# Patient Record
Sex: FEMALE | Race: WHITE | URBAN - METROPOLITAN AREA
[De-identification: names, ages, dates, MRNs, and addresses within clinical notes are randomized per-mention and may not be internally consistent; named-entity substitution may affect disease eponyms.]

---

## 2017-03-13 ENCOUNTER — OFFICE VISIT (OUTPATIENT)
Dept: CARDIOLOGY CLINIC | Age: 57
End: 2017-03-13

## 2017-03-13 VITALS
DIASTOLIC BLOOD PRESSURE: 72 MMHG | HEIGHT: 66 IN | HEART RATE: 56 BPM | SYSTOLIC BLOOD PRESSURE: 118 MMHG | WEIGHT: 224.2 LBS | BODY MASS INDEX: 36.03 KG/M2

## 2017-03-13 DIAGNOSIS — R07.89 CHEST DISCOMFORT: Primary | ICD-10-CM

## 2017-03-13 DIAGNOSIS — R00.2 PALPITATIONS: ICD-10-CM

## 2017-03-13 NOTE — PROGRESS NOTES
LAST OFFICE VISIT : 9/9/2016        ICD-10-CM ICD-9-CM   1. Chest discomfort R07.89 786.59   2. Palpitations R00.2 785.1            Iram Chilel is a 62 y.o. female with hypertension referred for 6 month follow up. Cardiac risk factors: post-menopausal, hypertension, sedentary lifestyle. I have personally obtained the history from the patient. HISTORY OF PRESENTING ILLNESS      She is doing well with no cardiac complaints today. She exercises occasionally. The patient denies chest pain/ shortness of breath, orthopnea, PND, LE edema, palpitations, syncope, presyncope or fatigue. ACTIVE PROBLEM LIST     Patient Active Problem List    Diagnosis Date Noted    Murmur, cardiac 07/22/2016    Dyslipidemia 07/22/2016    HTN (hypertension) 07/22/2016    Chest discomfort 07/22/2016    Palpitations 07/22/2016           PAST MEDICAL HISTORY     Past Medical History:   Diagnosis Date    Abnormal LFTs     Dyslipidemia     HTN (hypertension)     Murmur, cardiac            PAST SURGICAL HISTORY     History reviewed. No pertinent surgical history. ALLERGIES     Allergies   Allergen Reactions    Amoxicillin Rash          FAMILY HISTORY     History reviewed. No pertinent family history. negative for cardiac disease       SOCIAL HISTORY     Social History     Social History    Marital status:      Spouse name: N/A    Number of children: N/A    Years of education: N/A     Social History Main Topics    Smoking status: Never Smoker    Smokeless tobacco: None    Alcohol use No    Drug use: None    Sexual activity: Not Asked     Other Topics Concern    None     Social History Narrative         MEDICATIONS     Current Outpatient Prescriptions   Medication Sig    potassium chloride (K-DUR, KLOR-CON) 10 mEq tablet Take 20 mEq by mouth.  atorvastatin (LIPITOR) 10 mg tablet Take  by mouth daily.  aspirin delayed-release 81 mg tablet Take  by mouth daily.     valsartan-hydrochlorothiazide (DIOVAN-HCT) 160-25 mg per tablet Take 1 Tab by mouth daily. No current facility-administered medications for this visit. I have reviewed the nurses notes, vitals, problem list, allergy list, medical history, family, social history and medications. REVIEW OF SYMPTOMS      General: Pt denies excessive weight gain or loss. Pt is able to conduct ADL's  HEENT: Denies blurred vision, headaches, hearing loss, epistaxis and difficulty swallowing. Respiratory: Denies cough, congestion, shortness of breath, SHERIDAN, wheezing or stridor. Cardiovascular: Denies precordial pain, palpitations, edema or PND  Gastrointestinal: Denies poor appetite, indigestion, abdominal pain or blood in stool  Genitourinary: Denies hematuria, dysuria, increased urinary frequency  Musculoskeletal: Denies joint pain or swelling from muscles or joints  Neurologic: Denies tremor, paresthesias, headache, or sensory motor disturbance  Psychiatric: Denies confusion, insomnia, depression  Integumentray: Denies rash, itching or ulcers. Hematologic: Denies easy bruising, bleeding     PHYSICAL EXAMINATION      Vitals:    03/13/17 0933   BP: 118/72   Pulse: (!) 56   Weight: 224 lb 3.2 oz (101.7 kg)   Height: 5' 6\" (1.676 m)     General: Well developed, in no acute distress. HEENT: No jaundice, oral mucosa moist, no oral ulcers  Neck: Supple, no stiffness, no lymphadenopathy, supple  Heart:  Normal S1/S2 negative S3 or S4. Regular, no murmur, gallop or rub, no jugular venous distention  Respiratory: Clear bilaterally x 4, no wheezing or rales  Extremities:  No edema, normal cap refill, no cyanosis. Musculoskeletal: No clubbing, no deformities  Neuro: A&Ox3, speech clear, gait stable, cooperative, no focal neurologic deficits  Skin: Skin color is normal. No rashes or lesions. Non diaphoretic, moist.  Vascular: 2+ pulses symmetric in all extremities        EKG:      DIAGNOSTIC DATA     1.  Lipids  6/8/16- , HDL 40, LDL 76,     2. Echo  8/8/16- EF 60%, AV trileaflets exhibits sclerosis    3. Cardiolite  8/8/16- no ischemia    4. Holter  9/16/16- SR , frequent PVC's     LABORATORY DATA          No results found for: WBC, HGBPOC, HGB, HGBP, HCTPOC, HCT, PHCT, RBCH, PLT, MCV, HGBEXT, HCTEXT, PLTEXT, HGBEXT, HCTEXT, PLTEXT   No results found for: NA, K, CL, CO2, AGAP, GLU, BUN, CREA, BUCR, GFRAA, GFRNA, CA, TBIL, TBILI, GPT, SGOT, AP, TP, ALB, GLOB, AGRAT, ALT        ASSESSMENT/RECOMMENDATIONS:.      1. Chest discomfort  -no complaints of chest pain       2. Hypertension  -BP is under good control       3. Lipids   -cholesterol followed by her PCP       4. Palpitations  -she states that her palpitations have resolved  -24 hour holter monitor demonstrated frequent PVC's but nothing of great significance  -no testing needed   -the total ventricular ectopic beats represented only 1.28% of all beats      5. Return in 6 months or PRN     No orders of the defined types were placed in this encounter. Follow-up Disposition:  Return in about 6 months (around 9/13/2017). I have discussed the diagnosis with  Robert Abdullahi and the intended plan as seen in the above orders. Questions were answered concerning future plans. I have discussed medication side effects and warnings with the patient as well. Thank you,  Angy Roman MD for involving me in the care of  Robert Abdullahi. Please do not hesitate to contact me for further questions/concerns. This note was written by tono Wang, as dictated by Lalit Zavala MD.      Susana Gonsalves. MD [place], [address]      [facility], [address] [place] [address], [facility] [address] Jeane Desai.      [phone] / [phone] Fax

## 2017-03-13 NOTE — PROGRESS NOTES
Visit Vitals    /72 (BP 1 Location: Right arm, BP Patient Position: Sitting)    Pulse (!) 56    Ht 5' 6\" (1.676 m)    Wt 224 lb 3.2 oz (101.7 kg)    BMI 36.19 kg/m2

## 2017-03-13 NOTE — MR AVS SNAPSHOT
Visit Information Date & Time Provider Department Dept. Phone Encounter #  
 3/13/2017  9:20 AM Paige Bonilla MD [facility] [phone] [id number] Follow-up Instructions Return in about 6 months (around 9/13/2017). Upcoming Health Maintenance Date Due Hepatitis C Screening 1960 DTaP/Tdap/Td series (1 - Tdap) 2/24/1981 PAP AKA CERVICAL CYTOLOGY 2/24/1981 BREAST CANCER SCRN MAMMOGRAM 2/24/2010 FOBT Q 1 YEAR AGE 50-75 2/24/2010 INFLUENZA AGE 9 TO ADULT 8/1/2016 Allergies as of 3/13/2017  Review Complete On: 3/13/2017 By: Paige Bonilla MD  
  
 Severity Noted Reaction Type Reactions Amoxicillin  07/22/2016    Rash Current Immunizations  Never Reviewed No immunizations on file. Not reviewed this visit You Were Diagnosed With   
  
 Codes Comments Chest discomfort    -  Primary ICD-10-CM: R07.89 ICD-9-CM: 786.59 Palpitations     ICD-10-CM: R00.2 ICD-9-CM: 785.1 Vitals BP Pulse Height(growth percentile) Weight(growth percentile) BMI Smoking Status 118/72 (BP 1 Location: Right arm, BP Patient Position: Sitting) (!) 56 5' 6\" (1.676 m) 224 lb 3.2 oz (101.7 kg) 36.19 kg/m2 Never Smoker Vitals History BMI and BSA Data Body Mass Index Body Surface Area  
 36.19 kg/m 2 2.18 m 2 Preferred Pharmacy Pharmacy Name Phone [facility] [address] [place] [address] Drive, [address] [address] [phone] Your Updated Medication List  
  
   
This list is accurate as of: 3/13/17 10:03 AM.  Always use your most recent med list.  
  
  
  
  
 aspirin delayed-release 81 mg tablet Take  by mouth daily. atorvastatin 10 mg tablet Commonly known as:  LIPITOR Take  by mouth daily. potassium chloride 10 mEq tablet Commonly known as:  K-DUR, KLOR-CON Take 20 mEq by mouth.  
  
 valsartan-hydroCHLOROthiazide 160-25 mg per tablet Commonly known as:  DIOVAN-HCT Take 1 Tab by mouth daily. Follow-up Instructions Return in about 6 months (around 9/13/2017). Introducing [facility] SERVICES! Denzel Arenas introduces [employer] patient portal. Now you can access parts of your medical record, email your doctor's office, and request medication refills online. 1. In your internet browser, go to [email or web address]. [employer] 2. Click on the First Time User? Click Here link in the Sign In box. You will see the New Member Sign Up page. 3. Enter your [employer] Access Code exactly as it appears below. You will not need to use this code after youve completed the sign-up process. If you do not sign up before the expiration date, you must request a new code. · [employer] Access Code: [email or web address] Expires: 6/11/2017  9:33 AM 
 
4. Enter the last four digits of your Social Security Number (xxxx) and Date of Birth (mm/dd/yyyy) as indicated and click Submit. You will be taken to the next sign-up page. 5. Create a [employer] ID. This will be your [employer] login ID and cannot be changed, so think of one that is secure and easy to remember. 6. Create a [employer] password. You can change your password at any time. 7. Enter your Password Reset Question and Answer. This can be used at a later time if you forget your password. 8. Enter your e-mail address. You will receive e-mail notification when new information is available in [address] 9. Click Sign Up. You can now view and download portions of your medical record. 10. Click the Download Summary menu link to download a portable copy of your medical information. If you have questions, please visit the Frequently Asked Questions section of the [employer] website. Remember, [employer] is NOT to be used for urgent needs. For medical emergencies, dial 911. Now available from your iPhone and Android! Please provide this summary of care documentation to your next provider. Your primary care clinician is listed as Cassandra Sykes. If you have any questions after today's visit, please call [phone].

## 2017-11-17 ENCOUNTER — OFFICE VISIT (OUTPATIENT)
Dept: CARDIOLOGY CLINIC | Age: 57
End: 2017-11-17

## 2017-11-17 VITALS
HEART RATE: 71 BPM | SYSTOLIC BLOOD PRESSURE: 130 MMHG | WEIGHT: 214 LBS | DIASTOLIC BLOOD PRESSURE: 82 MMHG | BODY MASS INDEX: 34.54 KG/M2 | OXYGEN SATURATION: 98 %

## 2017-11-17 DIAGNOSIS — I10 HYPERTENSION, UNSPECIFIED TYPE: Primary | ICD-10-CM

## 2017-11-17 NOTE — PROGRESS NOTES
Pt has no complaints/no cardiac concerns    Visit Vitals    /82 (BP 1 Location: Left arm, BP Patient Position: Sitting)    Pulse 71    Wt 214 lb (97.1 kg)    SpO2 98%    BMI 34.54 kg/m2

## 2017-11-17 NOTE — PROGRESS NOTES
LAST OFFICE VISIT : 3/13/2017        ICD-10-CM ICD-9-CM   1. Hypertension, unspecified type I10 401.9            Iram Chilel is a 62 y.o. female with hypertension referred for 6 month follow up.          Cardiac risk factors: post-menopausal, hypertension, sedentary lifestyle. I have personally obtained the history from the patient. HISTORY OF PRESENTING ILLNESS     Overall the pt states she is doing well. The pt reports that she had to get a cortisone shot the day before she had lab work done. She attributes this to her elevated cholesterol. The pt has lost 10 lbs since her last office visit. The patient denies chest pain/ shortness of breath, orthopnea, PND, LE edema, palpitations, syncope, presyncope or fatigue. ACTIVE PROBLEM LIST     Patient Active Problem List    Diagnosis Date Noted    Murmur, cardiac 07/22/2016    Dyslipidemia 07/22/2016    HTN (hypertension) 07/22/2016    Chest discomfort 07/22/2016    Palpitations 07/22/2016           PAST MEDICAL HISTORY     Past Medical History:   Diagnosis Date    Abnormal LFTs     Dyslipidemia     HTN (hypertension)     Murmur, cardiac            PAST SURGICAL HISTORY     No past surgical history on file. ALLERGIES     Allergies   Allergen Reactions    Amoxicillin Rash          FAMILY HISTORY     No family history on file. negative for cardiac disease       SOCIAL HISTORY     Social History     Social History    Marital status:      Spouse name: N/A    Number of children: N/A    Years of education: N/A     Social History Main Topics    Smoking status: Never Smoker    Smokeless tobacco: Never Used    Alcohol use No    Drug use: None    Sexual activity: Not Asked     Other Topics Concern    None     Social History Narrative         MEDICATIONS     Current Outpatient Prescriptions   Medication Sig    potassium chloride (K-DUR, KLOR-CON) 10 mEq tablet Take 10 mEq by mouth.     atorvastatin (LIPITOR) 10 mg tablet Take by mouth daily.  aspirin delayed-release 81 mg tablet Take  by mouth daily.  valsartan-hydrochlorothiazide (DIOVAN-HCT) 160-25 mg per tablet Take 1 Tab by mouth daily. No current facility-administered medications for this visit. I have reviewed the nurses notes, vitals, problem list, allergy list, medical history, family, social history and medications. REVIEW OF SYMPTOMS      General: Pt denies excessive weight gain or loss. Pt is able to conduct ADL's  HEENT: Denies blurred vision, headaches, hearing loss, epistaxis and difficulty swallowing. Respiratory: Denies cough, congestion, shortness of breath, SHERIDAN, wheezing or stridor. Cardiovascular: Denies precordial pain, palpitations, edema or PND  Gastrointestinal: Denies poor appetite, indigestion, abdominal pain or blood in stool  Genitourinary: Denies hematuria, dysuria, increased urinary frequency  Musculoskeletal: Denies joint pain or swelling from muscles or joints  Neurologic: Denies tremor, paresthesias, headache, or sensory motor disturbance  Psychiatric: Denies confusion, insomnia, depression  Integumentray: Denies rash, itching or ulcers. Hematologic: Denies easy bruising, bleeding     PHYSICAL EXAMINATION      Vitals:    11/17/17 1457   BP: 130/82   Pulse: 71   SpO2: 98%   Weight: 214 lb (97.1 kg)     General: Well developed, in no acute distress. HEENT: No jaundice, oral mucosa moist, no oral ulcers  Neck: Supple, no stiffness, no lymphadenopathy, supple  Heart:  Normal S1/S2 negative S3 or S4. Regular, no murmur, gallop or rub, no jugular venous distention  Respiratory: Clear bilaterally x 4, no wheezing or rales  Extremities:  No edema, normal cap refill, no cyanosis. Musculoskeletal: No clubbing, no deformities  Neuro: A&Ox3, speech clear, gait stable, cooperative, no focal neurologic deficits  Skin: Skin color is normal. No rashes or lesions. Non diaphoretic, moist.          EKG: NSR     DIAGNOSTIC DATA     1. Lipids  6/8/16- , HDL 40, LDL 76,   11/1/17- , HDL 49, , TG 81    2. Echo  8/8/16- EF 60%, AV trileaflets exhibits sclerosis    3. Cardiolite  8/8/16- no ischemia    4. Holter  9/16/16- SR , frequent PVC's         LABORATORY DATA          No results found for: WBC, HGBPOC, HGB, HGBP, HCTPOC, HCT, PHCT, RBCH, PLT, MCV, HGBEXT, HCTEXT, PLTEXT, HGBEXT, HCTEXT, PLTEXT   No results found for: NA, K, CL, CO2, AGAP, GLU, BUN, CREA, BUCR, GFRAA, GFRNA, CA, TBIL, TBILI, GPT, SGOT, AP, TP, ALB, GLOB, AGRAT, ALT        ASSESSMENT/RECOMMENDATIONS:.      1. Chest discomfort  - she is asymptomatic at this time. I do not believe any further cardiac testing is needed. Continue risk factor modification. In 2016 all testing was negative. 2. Hypertension  - BP is well controlled in clinic today. I would not make any adjustments to her antihypertensives at this time. I counseled her to continue to exercise and eat a low sodium diet. 3. Lipids  -Cholesterol is follow by Unique Kathleen MD. LDL is close to goal but was mildly elevated due to a steroid injection she presumes. 4. Palpitations  - She has no complaints of palpitations today. I do not believe any further cardiac testing is needed. 5. Return in 6 months or PRN. Orders Placed This Encounter    AMB POC EKG ROUTINE W/ 12 LEADS, INTER & REP     Order Specific Question:   Reason for Exam:     Answer:   htn        Follow-up Disposition: Not on File      I have discussed the diagnosis with  Henrry Leal and the intended plan as seen in the above orders. Questions were answered concerning future plans. I have discussed medication side effects and warnings with the patient as well. Thank you,  Unique Kathleen MD for involving me in the care of  Henrry Leal. Please do not hesitate to contact me for further questions/concerns. Written by Iesha Maguire, as dictated by Jaqueline Woods MD.     Owen Gomez MD, [address]      [facility], [address] [place] [address], [facility] [address]      [phone] / [phone] Fax

## 2018-06-29 ENCOUNTER — OFFICE VISIT (OUTPATIENT)
Dept: CARDIOLOGY CLINIC | Age: 58
End: 2018-06-29

## 2018-06-29 VITALS
WEIGHT: 217 LBS | HEIGHT: 66 IN | BODY MASS INDEX: 34.87 KG/M2 | SYSTOLIC BLOOD PRESSURE: 140 MMHG | HEART RATE: 80 BPM | DIASTOLIC BLOOD PRESSURE: 80 MMHG

## 2018-06-29 DIAGNOSIS — I10 HYPERTENSION, UNSPECIFIED TYPE: Primary | ICD-10-CM

## 2018-06-29 DIAGNOSIS — R00.2 PALPITATIONS: ICD-10-CM

## 2018-06-29 NOTE — PROGRESS NOTES
LAST OFFICE VISIT : 11/17/2017        ICD-10-CM ICD-9-CM   1. Hypertension, unspecified type I10 401.9   2. Palpitations R00.2 785.1            Elvira Kovacs is a 62 y.o. female with hypertension and dyslipidemia referred for 6 month follow up. Cardiac risk factors: dyslipidemia, obesity, sedentary life style, hypertension, post-menopausal  I have personally obtained the history from the patient. HISTORY OF PRESENTING ILLNESS     Overall the pt states she is doing well. She notes that on Saturday around 6 am she woke up with a chest spasm. The pt states that she was diaphoretic at the time. She notes that her last episode of this was in 2016. The pt states that she ate a late night snack and believes that this caused her to have indigestion. She notes that this episode was short. The pt does not feel that she needs a stress test. She states that she had a colonoscopy done and had a breast cancer screening. The pt reports that she is not walking as much as she should be. The patient denies chest pain/ shortness of breath, orthopnea, PND, LE edema, palpitations, syncope, presyncope or fatigue. ACTIVE PROBLEM LIST     Patient Active Problem List    Diagnosis Date Noted    Murmur, cardiac 07/22/2016    Dyslipidemia 07/22/2016    HTN (hypertension) 07/22/2016    Chest discomfort 07/22/2016    Palpitations 07/22/2016           PAST MEDICAL HISTORY     Past Medical History:   Diagnosis Date    Abnormal LFTs     Dyslipidemia     HTN (hypertension)     Murmur, cardiac            PAST SURGICAL HISTORY     No past surgical history on file. ALLERGIES     Allergies   Allergen Reactions    Amoxicillin Rash          FAMILY HISTORY     No family history on file.  negative for cardiac disease       SOCIAL HISTORY     Social History     Social History    Marital status:      Spouse name: N/A    Number of children: N/A    Years of education: N/A     Social History Main Topics    Smoking status: Never Smoker    Smokeless tobacco: Never Used    Alcohol use No    Drug use: None    Sexual activity: Not Asked     Other Topics Concern    None     Social History Narrative         MEDICATIONS     Current Outpatient Prescriptions   Medication Sig    potassium chloride (K-DUR, KLOR-CON) 10 mEq tablet Take 10 mEq by mouth.  atorvastatin (LIPITOR) 10 mg tablet Take  by mouth daily.  aspirin delayed-release 81 mg tablet Take  by mouth daily.  valsartan-hydrochlorothiazide (DIOVAN-HCT) 160-25 mg per tablet Take 1 Tab by mouth daily. No current facility-administered medications for this visit. I have reviewed the nurses notes, vitals, problem list, allergy list, medical history, family, social history and medications. REVIEW OF SYMPTOMS      General: Pt denies excessive weight gain or loss. Pt is able to conduct ADL's  HEENT: Denies blurred vision, headaches, hearing loss, epistaxis and difficulty swallowing. Respiratory: Denies cough, congestion, shortness of breath, SHERIDAN, wheezing or stridor. Cardiovascular: Denies precordial pain, palpitations, edema or PND  Gastrointestinal: Denies poor appetite, indigestion, abdominal pain or blood in stool  Genitourinary: Denies hematuria, dysuria, increased urinary frequency  Musculoskeletal: Denies joint pain or swelling from muscles or joints  Neurologic: Denies tremor, paresthesias, headache, or sensory motor disturbance  Psychiatric: Denies confusion, insomnia, depression  Integumentray: Denies rash, itching or ulcers. Hematologic: Denies easy bruising, bleeding     PHYSICAL EXAMINATION      Vitals:    06/29/18 1347   BP: 140/80   Pulse: 80   Weight: 217 lb (98.4 kg)   Height: 5' 6\" (1.676 m)     General: Well developed, in no acute distress. HEENT: No jaundice, oral mucosa moist, no oral ulcers  Neck: Supple, no stiffness, no lymphadenopathy, supple  Heart:  Normal S1/S2 negative S3 or S4.  Regular, no murmur, gallop or rub, no jugular venous distention  Respiratory: Clear bilaterally x 4, no wheezing or rales  Extremities:  No edema, normal cap refill, no cyanosis. Musculoskeletal: No clubbing, no deformities  Neuro: A&Ox3, speech clear, gait stable, cooperative, no focal neurologic deficits  Skin: Skin color is normal. No rashes or lesions. Non diaphoretic, moist.           DIAGNOSTIC DATA     1. Lipids  6/8/16- , HDL 40, LDL 76,   11/1/17- , HDL 49, , TG 81  4/18/18- , HDL 49, LDL 80,     2. Echo  8/8/16- EF 60%, AV trileaflets exhibits sclerosis    3. Cardiolite  8/8/16- no ischemia    4. Holter  9/16/16- SR , frequent PVC's         LABORATORY DATA          No results found for: WBC, HGBPOC, HGB, HGBP, HCTPOC, HCT, PHCT, RBCH, PLT, MCV, HGBEXT, HCTEXT, PLTEXT, HGBEXT, HCTEXT, PLTEXT   No results found for: NA, K, CL, CO2, AGAP, GLU, BUN, CREA, BUCR, GFRAA, GFRNA, CA, TBIL, TBILI, GPT, SGOT, AP, TP, ALB, GLOB, AGRAT, ALT        ASSESSMENT/RECOMMENDATIONS:.      1. Chest discomfort  - Had an episode but this sounds non cardiac. I don't believe any cardiac testing is needed at this point and she agrees. If any more episodes were to occur would automatically order an exercise cardiolite, she knows to call. 2. Hypertension  - Blood pressure is borderline, I would not make any adjustments in antihypertensives at this time. I encouraged her to reduce her sodium intake and increase her exercise. 3. Dyslipidemia   - Lipids are being followed by John Chun MD and have come down since she finished her steroid injections. 4. Return in 6 months or PRN. No orders of the defined types were placed in this encounter. Follow-up Disposition:  Return in about 6 months (around 12/29/2018). I have discussed the diagnosis with  Luann Palomares and the intended plan as seen in the above orders. Questions were answered concerning future plans.   I have discussed medication side effects and warnings with the patient as well. Thank you,  Amairani Ortega MD for involving me in the care of  Milind Weiner. Please do not hesitate to contact me for further questions/concerns. Written by Hi Leong, as dictated by Santos Hodgson MD.     Sudarshan Orozco MD, [facility]    Patient Care Team:  Amairani Ortega MD as PCP - General (Family Practice)    [address], [address] [place] [address]      [phone] / [phone] Fax

## 2018-06-29 NOTE — MR AVS SNAPSHOT
[address] [address] 
[phone] Patient: Elvira Kovacs MRN: [id number] KMQ:7/31/[birth year]24 Visit Information Date & Time Provider Department Dept. Phone Encounter #  
 6/29/2018  1:40 PM Mary Pink MD [facility] Yaritza Castro [phone] [id number] Follow-up Instructions Return in about 6 months (around 12/29/2018). Follow-up and Disposition History Your Appointments 1/11/2019  1:40 PM  
ESTABLISHED PATIENT with Mary Pink MD  
[facility] ([facility] SCHEDULING) Appt Note: 6 mo fu appt [address] [address] [address]  
[phone]  
  
   
 [address] [facility] [address] [place] [address] Road [address] Upcoming Health Maintenance Date Due Hepatitis C Screening 1960 DTaP/Tdap/Td series (1 - Tdap) 2/24/1981 PAP AKA CERVICAL CYTOLOGY 2/24/1981 BREAST CANCER SCRN MAMMOGRAM 2/24/2010 FOBT Q 1 YEAR AGE 50-75 2/24/2010 Influenza Age 5 to Adult 8/1/2018 Allergies as of 6/29/2018  Review Complete On: 6/29/2018 By: Mary Pink MD  
  
 Severity Noted Reaction Type Reactions Amoxicillin  07/22/2016    Rash Current Immunizations  Never Reviewed No immunizations on file. Not reviewed this visit You Were Diagnosed With   
  
 Codes Comments Hypertension, unspecified type    -  Primary ICD-10-CM: I10 
ICD-9-CM: 401.9 Palpitations     ICD-10-CM: R00.2 ICD-9-CM: 785.1 Vitals BP Pulse Height(growth percentile) Weight(growth percentile) BMI Smoking Status 140/80 80 5' 6\" (1.676 m) 217 lb (98.4 kg) 35.02 kg/m2 Never Smoker Vitals History BMI and BSA Data Body Mass Index Body Surface Area 35.02 kg/m 2 2.14 m 2 Preferred Pharmacy Pharmacy Name Phone [address], [address] [place] [address] [address]. [address] [facility] [address] [phone] Your Updated Medication List  
  
   
 This list is accurate as of 6/29/18  2:00 PM.  Always use your most recent med list.  
  
  
  
  
 aspirin delayed-release 81 mg tablet Take  by mouth daily. atorvastatin 10 mg tablet Commonly known as:  LIPITOR Take  by mouth daily. potassium chloride 10 mEq tablet Commonly known as:  KLOR-CON Take 10 mEq by mouth.  
  
 valsartan-hydroCHLOROthiazide 160-25 mg per tablet Commonly known as:  DIOVAN-HCT Take 1 Tab by mouth daily. Follow-up Instructions Return in about 6 months (around 12/29/2018). Introducing [facility]! Raman Ly introduces [employer] patient portal. Now you can access parts of your medical record, email your doctor's office, and request medication refills online. 1. In your internet browser, go to [email or web address]. [email or web address] 2. Click on the First Time User? Click Here link in the Sign In box. You will see the New Member Sign Up page. 3. Enter your [employer] Access Code exactly as it appears below. You will not need to use this code after youve completed the sign-up process. If you do not sign up before the expiration date, you must request a new code. · [employer] Access Code: [email or web address] Expires: 9/27/2018  1:46 PM 
 
4. Enter the last four digits of your Social Security Number (xxxx) and Date of Birth (mm/dd/yyyy) as indicated and click Submit. You will be taken to the next sign-up page. 5. Create a [employer] ID. This will be your [employer] login ID and cannot be changed, so think of one that is secure and easy to remember. 6. Create a [employer] password. You can change your password at any time. 7. Enter your Password Reset Question and Answer. This can be used at a later time if you forget your password. 8. Enter your e-mail address. You will receive e-mail notification when new information is available in [address] 9. Click Sign Up. You can now view and download portions of your medical record. 10. Click the Download Summary menu link to download a portable copy of your medical information. If you have questions, please visit the Frequently Asked Questions section of the [employer] website. Remember, [employer] is NOT to be used for urgent needs. For medical emergencies, dial 911. Now available from your iPhone and Android! Please provide this summary of care documentation to your next provider. Your primary care clinician is listed as Cassandra Sykes. If you have any questions after today's visit, please call [phone].

## 2018-06-29 NOTE — PROGRESS NOTES
Visit Vitals    /80    Pulse 80    Ht 5' 6\" (1.676 m)    Wt 217 lb (98.4 kg)    BMI 35.02 kg/m2

## 2019-04-12 ENCOUNTER — OFFICE VISIT (OUTPATIENT)
Dept: CARDIOLOGY CLINIC | Age: 59
End: 2019-04-12

## 2019-04-12 VITALS
HEART RATE: 69 BPM | RESPIRATION RATE: 16 BRPM | OXYGEN SATURATION: 95 % | SYSTOLIC BLOOD PRESSURE: 140 MMHG | WEIGHT: 217 LBS | HEIGHT: 66 IN | DIASTOLIC BLOOD PRESSURE: 86 MMHG | BODY MASS INDEX: 34.87 KG/M2

## 2019-04-12 DIAGNOSIS — I10 HYPERTENSION, UNSPECIFIED TYPE: ICD-10-CM

## 2019-04-12 DIAGNOSIS — E78.5 DYSLIPIDEMIA: ICD-10-CM

## 2019-04-12 DIAGNOSIS — R00.2 PALPITATIONS: ICD-10-CM

## 2019-04-12 DIAGNOSIS — I49.9 IRREGULAR HEART BEAT: Primary | ICD-10-CM

## 2019-04-12 PROBLEM — E66.01 SEVERE OBESITY (HCC): Status: ACTIVE | Noted: 2019-04-12

## 2019-04-12 RX ORDER — LISINOPRIL AND HYDROCHLOROTHIAZIDE 12.5; 2 MG/1; MG/1
TABLET ORAL
Refills: 0 | COMMUNITY
Start: 2019-04-07

## 2019-04-12 NOTE — PROGRESS NOTES
LAST OFFICE VISIT : 6/29/2018        ICD-10-CM ICD-9-CM   1. Irregular heart beat I49.9 427.9   2. Hypertension, unspecified type I10 401.9   3. Dyslipidemia E78.5 272.4   4. Palpitations R00.2 785.1            Marcos Hernandez is a 61 y.o. female with HTN and dyslipidemia referred for follow up. Cardiac risk factors: dyslipidemia, obesity, hypertension, post-menopausal, stress  I have personally obtained the history from the patient. HISTORY OF PRESENTING ILLNESS     Pt has not been doing well as her brother was diagnosed with bladder CA and recently passed away. Pt will be getting lab work soon with PCP. The patient denies chest pain/ shortness of breath, orthopnea, PND, LE edema, palpitations, syncope, presyncope or fatigue. ACTIVE PROBLEM LIST     Patient Active Problem List    Diagnosis Date Noted    Severe obesity ([address] 04/12/2019    Murmur, cardiac 07/22/2016    Dyslipidemia 07/22/2016    HTN (hypertension) 07/22/2016    Chest discomfort 07/22/2016    Palpitations 07/22/2016           PAST MEDICAL HISTORY     Past Medical History:   Diagnosis Date    Abnormal LFTs     Dyslipidemia     HTN (hypertension)     Murmur, cardiac            PAST SURGICAL HISTORY     History reviewed. No pertinent surgical history.        ALLERGIES     Allergies   Allergen Reactions    Amoxicillin Rash          FAMILY HISTORY     Family History   Problem Relation Age of Onset    Diabetes Mother     Hypertension Mother     Hypertension Father     negative for cardiac disease       SOCIAL HISTORY     Social History     Socioeconomic History    Marital status:      Spouse name: Not on file    Number of children: Not on file    Years of education: Not on file    Highest education level: Not on file   Tobacco Use    Smoking status: Never Smoker    Smokeless tobacco: Never Used   Substance and Sexual Activity    Alcohol use: No     Alcohol/week: 0.0 oz         MEDICATIONS     Current Outpatient Medications   Medication Sig    lisinopril-hydroCHLOROthiazide (PRINZIDE, ZESTORETIC) 20-12.5 mg per tablet     potassium chloride (K-DUR, KLOR-CON) 10 mEq tablet Take 10 mEq by mouth.  atorvastatin (LIPITOR) 10 mg tablet Take  by mouth daily.  aspirin delayed-release 81 mg tablet Take  by mouth daily. No current facility-administered medications for this visit. I have reviewed the nurses notes, vitals, problem list, allergy list, medical history, family, social history and medications. REVIEW OF SYMPTOMS      General: Pt denies excessive weight gain or loss. Pt is able to conduct ADL's  HEENT: Denies blurred vision, headaches, hearing loss, epistaxis and difficulty swallowing. Respiratory: Denies cough, congestion, shortness of breath, SHERIDAN, wheezing or stridor. Cardiovascular: Denies precordial pain, palpitations, edema or PND  Gastrointestinal: Denies poor appetite, indigestion, abdominal pain or blood in stool  Genitourinary: Denies hematuria, dysuria, increased urinary frequency  Musculoskeletal: Denies joint pain or swelling from muscles or joints  Neurologic: Denies tremor, paresthesias, headache, or sensory motor disturbance  Psychiatric: Denies confusion, insomnia, depression  Integumentray: Denies rash, itching or ulcers. Hematologic: Denies easy bruising, bleeding     PHYSICAL EXAMINATION      Vitals:    04/12/19 1352   BP: 140/86   Pulse: 69   Resp: 16   SpO2: 95%   Weight: 217 lb (98.4 kg)   Height: 5' 6\" (1.676 m)     General: Well developed, in no acute distress. HEENT: No jaundice, oral mucosa moist, no oral ulcers  Neck: Supple, no stiffness, no lymphadenopathy, supple  Heart:  Normal S1/S2 negative S3 or S4. Regular, no murmur, gallop or rub, no jugular venous distention  Respiratory: Clear bilaterally x 4, no wheezing or rales  Abdomen:   Soft, non-tender, bowel sounds are active.   Extremities:  No edema, normal cap refill, no cyanosis.   Musculoskeletal: No clubbing, no deformities  Neuro: A&Ox3, speech clear, gait stable, cooperative, no focal neurologic deficits  Skin: Skin color is normal. No rashes or lesions. Non diaphoretic, moist.  Vascular: 2+ pulses symmetric in all extremities        EKG: NSR     DIAGNOSTIC DATA     1. Lipids  6/8/16- , HDL 40, LDL 76,   11/1/17- , HDL 49, , TG 81  4/18/18- , HDL 49, LDL 80,     2. Echo  8/8/16- EF 60%, AV trileaflets exhibits sclerosis    3. Cardiolite  8/8/16- no ischemia    4. Holter  9/16/16- SR , frequent PVC's         LABORATORY DATA          No results found for: WBC, HGBPOC, HGB, HGBP, HCTPOC, HCT, PHCT, RBCH, PLT, MCV, HGBEXT, HCTEXT, PLTEXT, HGBEXT, HCTEXT, PLTEXT   No results found for: NA, K, CL, CO2, AGAP, GLU, BUN, CREA, BUCR, GFRAA, GFRNA, CA, TBIL, TBILI, GPT, SGOT, AP, TP, ALB, GLOB, AGRAT, ALT        ASSESSMENT/RECOMMENDATIONS:.      1. Chest discomfort   - she is asymptomatic at this time. I do not believe any further cardiac testing is needed. Continue risk factor modification. She'll let me know if she has any reoccurring discomfort and will consider ordering testings then. 2. HTN   - BP is borderline elevated. Counseled on low-sodium diet. Grade I HTN.   - Will not adjust her antihypertensives at this time as she's been going through a lot of stress at this time. - Will have her come back in 2 weeks for BP recheck. 3. Dyslipidemia  - Lipids are at goal on current medical regimen. Followed by Kevin Guerrero MD   4. Return in 6 months or PRN. Orders Placed This Encounter    AMB POC EKG ROUTINE W/ 12 LEADS, INTER & REP     Order Specific Question:   Reason for Exam:     Answer:   Routine    lisinopril-hydroCHLOROthiazide (PRINZIDE, ZESTORETIC) 20-12.5 mg per tablet     Refill:  0          Follow-up and Dispositions  ·   Return in about 6 months (around 10/12/2019).            I have discussed the diagnosis with  Oma De La Paz and the intended plan as seen in the above orders. Questions were answered concerning future plans. I have discussed medication side effects and warnings with the patient as well. Thank you,  Chani Molina MD for involving me in the care of  Henrry Leal. Please do not hesitate to contact me for further questions/concerns. Written by Jason Dunn, as dictated by Jaqueline Woods MD.     Owen Gomez MD, [facility]    Patient Care Team:  Chani Molina MD as PCP - General (Family Practice)  Kristian Mayberry MD (Cardiology)    [address], [address] [place] [address] Drive      [phone] / [phone] Fax

## 2019-04-12 NOTE — PROGRESS NOTES
1. Have you been to the ER, urgent care clinic since your last visit? Hospitalized since your last visit? No    2. Have you seen or consulted any other health care providers outside of the [address] [place] [address] since your last visit? Include any pap smears or colon screening. No     Pt reports Med Rec. Completed.      Chief Complaint   Patient presents with    Hypertension    Irregular Heart Beat     Visit Vitals  /86 (BP 1 Location: Right arm, BP Patient Position: Sitting)   Pulse 69   Resp 16   Ht 5' 6\" (1.676 m)   Wt 217 lb (98.4 kg)   SpO2 95%   BMI 35.02 kg/m²

## 2019-04-12 NOTE — LETTER
4/12/19 Patient: Raimundo Hill YOB: 1960 Date of Visit: 4/12/2019 Phyllis Leigh MD 
[address] [address] VIA Facsimile: [phone] Dear Phyllis Leigh MD, Thank you for referring Ms. Earl العراقي to [facility] for evaluation. My notes for this consultation are attached. If you have questions, please do not hesitate to call me. I look forward to following your patient along with you.  
 
 
Sincerely, 
 
Kaylynn Briceño MD

## 2019-04-24 ENCOUNTER — TELEPHONE (OUTPATIENT)
Dept: CARDIOLOGY CLINIC | Age: 59
End: 2019-04-24

## 2019-04-24 ENCOUNTER — CLINICAL SUPPORT (OUTPATIENT)
Dept: CARDIOLOGY CLINIC | Age: 59
End: 2019-04-24

## 2019-04-24 VITALS
DIASTOLIC BLOOD PRESSURE: 66 MMHG | WEIGHT: 217.5 LBS | HEIGHT: 66 IN | RESPIRATION RATE: 18 BRPM | HEART RATE: 60 BPM | SYSTOLIC BLOOD PRESSURE: 112 MMHG | BODY MASS INDEX: 34.96 KG/M2

## 2019-04-24 DIAGNOSIS — I10 HYPERTENSION, UNSPECIFIED TYPE: Primary | ICD-10-CM

## 2019-04-24 NOTE — PROGRESS NOTES
Called, spoke to pt. Two pt identifiers confirmed. Pt informed per Dr. [place] BP looks good, no further action needed. Pt verbalized understanding of information discussed w/ no further questions at this time.

## 2019-04-24 NOTE — TELEPHONE ENCOUNTER
Called, spoke to pt. Two pt identifiers confirmed. Pt informed per Dr. Shelbi Mac BP is good, no further action needed. Pt verbalized understanding of information discussed w/ no further questions at this time.

## 2019-04-24 NOTE — PROGRESS NOTES
Patient is here for a blood pressure check due to the fact that her numbers were elevated at her last visit. Readings were much better today.

## 2019-04-24 NOTE — TELEPHONE ENCOUNTER
Patient stated she is returning a call regarding her results of the BP check today   Phone: [phone]

## 2022-03-19 PROBLEM — E66.01 SEVERE OBESITY (HCC): Status: ACTIVE | Noted: 2019-04-12

## 2022-07-05 LAB — HBA1C MFR BLD HPLC: 6 %

## 2023-07-21 ENCOUNTER — OFFICE VISIT (OUTPATIENT)
Age: 63
End: 2023-07-21
Payer: COMMERCIAL

## 2023-07-21 VITALS
BODY MASS INDEX: 36.4 KG/M2 | WEIGHT: 213.2 LBS | SYSTOLIC BLOOD PRESSURE: 130 MMHG | OXYGEN SATURATION: 99 % | DIASTOLIC BLOOD PRESSURE: 80 MMHG | HEART RATE: 78 BPM | HEIGHT: 64 IN

## 2023-07-21 DIAGNOSIS — R00.2 PALPITATIONS: Primary | ICD-10-CM

## 2023-07-21 DIAGNOSIS — I10 HYPERTENSION, UNSPECIFIED TYPE: ICD-10-CM

## 2023-07-21 PROCEDURE — 3075F SYST BP GE 130 - 139MM HG: CPT | Performed by: SPECIALIST

## 2023-07-21 PROCEDURE — 93000 ELECTROCARDIOGRAM COMPLETE: CPT | Performed by: SPECIALIST

## 2023-07-21 PROCEDURE — 99214 OFFICE O/P EST MOD 30 MIN: CPT | Performed by: SPECIALIST

## 2023-07-21 PROCEDURE — 3079F DIAST BP 80-89 MM HG: CPT | Performed by: SPECIALIST

## 2023-07-21 RX ORDER — ASPIRIN 81 MG/1
TABLET ORAL DAILY
COMMUNITY

## 2023-07-21 RX ORDER — ATORVASTATIN CALCIUM 10 MG/1
TABLET, FILM COATED ORAL
COMMUNITY
Start: 2017-10-01

## 2023-07-21 RX ORDER — NITROFURANTOIN MACROCRYSTALS 50 MG/1
CAPSULE ORAL
COMMUNITY
Start: 2023-07-12

## 2023-07-21 RX ORDER — POTASSIUM CHLORIDE 750 MG/1
10 TABLET, EXTENDED RELEASE ORAL
COMMUNITY

## 2023-07-21 RX ORDER — LISINOPRIL AND HYDROCHLOROTHIAZIDE 20; 12.5 MG/1; MG/1
TABLET ORAL
COMMUNITY
Start: 2019-04-07

## 2023-07-21 RX ORDER — ESTRADIOL 0.1 MG/G
2 CREAM VAGINAL DAILY
COMMUNITY

## 2023-07-21 NOTE — PATIENT INSTRUCTIONS

## 2023-07-21 NOTE — PROGRESS NOTES
NAME Jeanette Craven         1960      MRN    [id number]      LAST OFFICE APPOINTMENT: Visit date not found     DIAGNOSIS    ICD-10-CM    1. Palpitations  R00.2       2. Hypertension, unspecified type  I10           HOME MEDICATION  Current Outpatient Medications   Medication Sig    aspirin 81 MG EC tablet Take by mouth daily    atorvastatin (LIPITOR) 10 MG tablet     lisinopril-hydroCHLOROthiazide (PRINZIDE;ZESTORETIC) 20-12.5 MG per tablet     nitrofurantoin (MACRODANTIN) 50 MG capsule TAKE 1 CAPSULE BY MOUTH AT BEDTIME WITH MEALS    potassium chloride (KLOR-CON M) 10 MEQ extended release tablet Take 1 tablet by mouth    NONFORMULARY Cranberry Supplement    estradiol (ESTRACE) 0.1 MG/GM vaginal cream Place 2 g vaginally daily     No current facility-administered medications for this visit. VITAL SIGNS  Wt Readings from Last 3 Encounters:   23 213 lb 3.2 oz (96.7 kg)     BP Readings from Last 3 Encounters:   23 130/80     Pulse Readings from Last 3 Encounters:   23 78         SPECIALTY COMMENTS  1. Lipids  16- , HDL 40, LDL 76,   17- , HDL 49, , TG 81  18- , HDL 49, LDL 80,   22-, HDL 52, LDL 77,     2. Echo  16- EF 60%, AV trileaflets exhibits sclerosis    3. Cardiolite  16- no ischemia    4.  Holter  16- SR , frequent PVC's

## 2023-07-21 NOTE — PROGRESS NOTES
CARDIOLOGY OFFICE NOTE    Renato Matthews MD, [facility] [address], [place]  Phone [phone]; Fax [phone]  Mobile [phone]   Voice Mail [phone]    Primary care: Deysi Butler MD       ATTENTION:   This medical record was transcribed using an electronic medical records/speech recognition system. Although proofread, it may and can contain electronic, spelling and other errors. Corrections may be executed at a later time. Please feel free to contact us for any clarifications as needed. Carmela Watson is a 61 y.o. female with  referred for  HTN and dyslipidemia     Cardiac risk factors: dyslipidemia, obesity, hypertension, post-menopausal, stress  I have personally obtained the history from the patient. HISTORY OF PRESENTING ILLNESS    Ms./Mr. Carmela Watson  61 y.o. is is here for follow-up of palpitations and dyslipidemia. Is been about 4 to 5 years since I last saw her. In the interim her brother passed away and her  did as well. He had a TAVR and 3 days later passed away. She has been doing well with no chest pain or shortness of breath. She just believe that she needed to be seen intermittently by cardiology and wanted a follow-up appointment. Blood pressures at home will be low at times but overall she is asymptomatic. ACTIVE PROBLEM LIST     Patient Active Problem List    Diagnosis Date Noted    Severe obesity ([address]) 04/12/2019    Palpitations 07/22/2016    Dyslipidemia 07/22/2016    Murmur, cardiac 07/22/2016    Chest discomfort 07/22/2016    HTN (hypertension) 07/22/2016           PAST MEDICAL HISTORY     No past medical history on file. PAST SURGICAL HISTORY     No past surgical history on file. ALLERGIES     Allergies   Allergen Reactions    Sulfamethoxazole-Trimethoprim Hives    Amoxicillin Rash          FAMILY HISTORY     No family history on file.  negative for cardiac disease       SOCIAL

## 2023-08-16 LAB
ALBUMIN SERPL-MCNC: 4.6 G/DL (ref 3.9–4.9)
ALP SERPL-CCNC: 81 IU/L (ref 44–121)
ALT SERPL-CCNC: 11 IU/L (ref 0–32)
AST SERPL-CCNC: 17 IU/L (ref 0–40)
BILIRUB DIRECT SERPL-MCNC: 0.18 MG/DL (ref 0–0.4)
BILIRUB SERPL-MCNC: 0.7 MG/DL (ref 0–1.2)
CHOLEST SERPL-MCNC: 176 MG/DL (ref 100–199)
HDLC SERPL-MCNC: 56 MG/DL
LDLC SERPL CALC-MCNC: 101 MG/DL (ref 0–99)
PROT SERPL-MCNC: 7.1 G/DL (ref 6–8.5)
TRIGL SERPL-MCNC: 108 MG/DL (ref 0–149)
VLDLC SERPL CALC-MCNC: 19 MG/DL (ref 5–40)

## 2023-08-31 ENCOUNTER — TELEPHONE (OUTPATIENT)
Age: 63
End: 2023-08-31

## 2023-08-31 RX ORDER — LISINOPRIL AND HYDROCHLOROTHIAZIDE 20; 12.5 MG/1; MG/1
1 TABLET ORAL DAILY
Qty: 90 TABLET | Refills: 1 | Status: SHIPPED | OUTPATIENT
Start: 2023-08-31

## 2023-08-31 RX ORDER — ATORVASTATIN CALCIUM 10 MG/1
10 TABLET, FILM COATED ORAL DAILY
Qty: 90 TABLET | Refills: 1 | Status: SHIPPED | OUTPATIENT
Start: 2023-08-31

## 2023-08-31 RX ORDER — POTASSIUM CHLORIDE 750 MG/1
10 TABLET, EXTENDED RELEASE ORAL DAILY
Qty: 90 TABLET | Refills: 1 | Status: SHIPPED | OUTPATIENT
Start: 2023-08-31

## 2023-08-31 NOTE — TELEPHONE ENCOUNTER
Pt. Asking for prescriptions to be filled.     Lisinopril 12.5mg  Atorvastatin 19mg  Potassium chloride 10 [facility]  [phone]

## 2023-09-01 DIAGNOSIS — Z13.220 SCREENING CHOLESTEROL LEVEL: Primary | ICD-10-CM

## 2023-09-01 DIAGNOSIS — I10 HYPERTENSION, UNSPECIFIED TYPE: ICD-10-CM

## 2023-09-01 DIAGNOSIS — R00.2 PALPITATIONS: ICD-10-CM

## 2024-03-01 NOTE — PATIENT INSTRUCTIONS

## 2024-03-04 ENCOUNTER — OFFICE VISIT (OUTPATIENT)
Age: 64
End: 2024-03-04
Payer: COMMERCIAL

## 2024-03-04 VITALS
HEART RATE: 55 BPM | WEIGHT: 208.8 LBS | SYSTOLIC BLOOD PRESSURE: 122 MMHG | DIASTOLIC BLOOD PRESSURE: 80 MMHG | BODY MASS INDEX: 35.65 KG/M2 | HEIGHT: 64 IN | OXYGEN SATURATION: 99 %

## 2024-03-04 DIAGNOSIS — E78.00 HYPERCHOLESTEREMIA: Primary | ICD-10-CM

## 2024-03-04 DIAGNOSIS — I10 HYPERTENSION, UNSPECIFIED TYPE: ICD-10-CM

## 2024-03-04 DIAGNOSIS — R00.2 PALPITATIONS: Primary | ICD-10-CM

## 2024-03-04 DIAGNOSIS — R00.2 PALPITATIONS: ICD-10-CM

## 2024-03-04 PROCEDURE — 99214 OFFICE O/P EST MOD 30 MIN: CPT | Performed by: SPECIALIST

## 2024-03-04 PROCEDURE — 3079F DIAST BP 80-89 MM HG: CPT | Performed by: SPECIALIST

## 2024-03-04 PROCEDURE — 3074F SYST BP LT 130 MM HG: CPT | Performed by: SPECIALIST

## 2024-03-04 RX ORDER — METHENAMINE HIPPURATE 1000 MG/1
1 TABLET ORAL 2 TIMES DAILY
COMMUNITY
Start: 2024-01-16

## 2024-03-04 NOTE — PROGRESS NOTES
Chief Complaint   Patient presents with    Palpitations    Hypertension     Vitals:    24 1028   BP: 122/80   Site: Left Upper Arm   Position: Sitting   Pulse: 55   SpO2: 99%   Weight: 94.7 kg (208 lb 12.8 oz)   Height: 1.626 m (5' 4\")     Chest pain: DENIED     Recent hospital stays: DENIED     Refills: LIPITOR, LISINOPRIL     NAME Loretta Gibbons         1960      MRN    [id number]      LAST OFFICE APPOINTMENT: 2023     DIAGNOSIS    ICD-10-CM    1. Palpitations  R00.2       2. Hypertension, unspecified type  I10           HOME MEDICATION  Current Outpatient Medications   Medication Sig    methenamine (HIPREX) 1 g tablet Take 1 tablet by mouth 2 times daily    D-Mannose 500 MG CAPS Take 500 mg by mouth in the morning and at bedtime    atorvastatin (LIPITOR) 10 MG tablet Take 1 tablet by mouth daily    lisinopril-hydroCHLOROthiazide (PRINZIDE;ZESTORETIC) 20-12.5 MG per tablet Take 1 tablet by mouth daily    potassium chloride (KLOR-CON M) 10 MEQ extended release tablet Take 1 tablet by mouth daily    aspirin 81 MG EC tablet Take 1 tablet by mouth Every Monday, Wednesday, and Friday    nitrofurantoin (MACRODANTIN) 50 MG capsule TAKE 1 CAPSULE BY MOUTH AT BEDTIME WITH MEALS    NONFORMULARY Cranberry Supplement    estradiol (ESTRACE) 0.1 MG/GM vaginal cream Place 2 g vaginally daily     No current facility-administered medications for this visit.       VITAL SIGNS  Wt Readings from Last 3 Encounters:   24 94.7 kg (208 lb 12.8 oz)   23 96.7 kg (213 lb 3.2 oz)     BP Readings from Last 3 Encounters:   24 122/80   23 130/80     Pulse Readings from Last 3 Encounters:   24 55   23 78         SPECIALTY COMMENTS  1. Lipids  16- , HDL 40, LDL 76,   17- , HDL 49, , TG 81  18- , HDL 49, LDL 80,   22-, HDL 52, LDL 77,   8/15/23-, HDL 56,      2. Echo  16- EF 60%, AV trileaflets exhibits 
and how that may be beneficial in determining the whether or not we should be more aggressive to lower your LDL  -She will change her diet and then recheck her labs in 6 months if her LDL is above 100 consider switching her to Crestor  4.  Obesity BMI is 36 and she is attempting to work on weight loss  5. Return in 6 months    No orders of the defined types were placed in this encounter.      We discussed the expected course, resolution and complications of the diagnosis(es) in detail.  Medication risks, benefits, costs, interactions, and alternatives were discussed as indicated.  I advised him to contact the office if his condition worsens, changes or fails to improve as anticipated. He expressed understanding with the diagnosis(es) and plan        No follow-up provider specified.      I have discussed the diagnosis with  Loretta Gibbons and the intended plan as seen in the above orders.  Questions were answered concerning future plans.  I have discussed medication side effects and warnings with the patient as well.    Thank you,  Lucia Arteaga MD for involving me in the care of  Loretta Gibbons. Please do not hesitate to contact me for further questions/concerns.         Renato Ledezma MD, [facility]  [facility]      [address] [facility], [address]     [place], [place]  [address] [phone] / [phone] Fax

## 2024-03-07 ENCOUNTER — HOSPITAL ENCOUNTER (OUTPATIENT)
Facility: HOSPITAL | Age: 64
Discharge: HOME | End: 2024-03-07
Attending: SPECIALIST

## 2024-03-07 DIAGNOSIS — Z00.00 PREVENTATIVE HEALTH CARE: ICD-10-CM

## 2024-03-07 PROCEDURE — 75571 CT HRT W/O DYE W/CA TEST: CPT

## 2024-03-12 NOTE — RESULT ENCOUNTER NOTE
Your calcium score is 0    This is good news    This indicates that we should lower your LDL or lousy cholesterol between .    All the best,    Renato

## 2024-03-14 RX ORDER — LISINOPRIL AND HYDROCHLOROTHIAZIDE 20; 12.5 MG/1; MG/1
1 TABLET ORAL DAILY
Qty: 90 TABLET | Refills: 3 | Status: SHIPPED | OUTPATIENT
Start: 2024-03-14

## 2024-03-14 RX ORDER — ATORVASTATIN CALCIUM 10 MG/1
10 TABLET, FILM COATED ORAL DAILY
Qty: 90 TABLET | Refills: 3 | Status: SHIPPED | OUTPATIENT
Start: 2024-03-14

## 2024-12-18 DIAGNOSIS — I10 HYPERTENSION, UNSPECIFIED TYPE: ICD-10-CM

## 2024-12-18 DIAGNOSIS — R00.2 PALPITATIONS: ICD-10-CM
